# Patient Record
Sex: MALE | Race: WHITE | Employment: OTHER | URBAN - METROPOLITAN AREA
[De-identification: names, ages, dates, MRNs, and addresses within clinical notes are randomized per-mention and may not be internally consistent; named-entity substitution may affect disease eponyms.]

---

## 2017-01-30 PROBLEM — J44.9 COPD (CHRONIC OBSTRUCTIVE PULMONARY DISEASE) (HCC): Status: ACTIVE | Noted: 2017-01-30

## 2021-02-11 ENCOUNTER — INITIAL CONSULT (OUTPATIENT)
Dept: PULMONOLOGY | Age: 76
End: 2021-02-11
Payer: COMMERCIAL

## 2021-02-11 VITALS
SYSTOLIC BLOOD PRESSURE: 124 MMHG | HEART RATE: 62 BPM | HEIGHT: 70 IN | WEIGHT: 233 LBS | OXYGEN SATURATION: 96 % | BODY MASS INDEX: 33.36 KG/M2 | DIASTOLIC BLOOD PRESSURE: 68 MMHG

## 2021-02-11 DIAGNOSIS — Z87.891 FORMER SMOKER: ICD-10-CM

## 2021-02-11 DIAGNOSIS — J44.9 CHRONIC OBSTRUCTIVE PULMONARY DISEASE, UNSPECIFIED COPD TYPE (HCC): Primary | ICD-10-CM

## 2021-02-11 DIAGNOSIS — R06.02 SOB (SHORTNESS OF BREATH): ICD-10-CM

## 2021-02-11 DIAGNOSIS — Z01.818 PREOP TESTING: ICD-10-CM

## 2021-02-11 PROCEDURE — G8417 CALC BMI ABV UP PARAM F/U: HCPCS | Performed by: NURSE PRACTITIONER

## 2021-02-11 PROCEDURE — G8926 SPIRO NO PERF OR DOC: HCPCS | Performed by: NURSE PRACTITIONER

## 2021-02-11 PROCEDURE — G8484 FLU IMMUNIZE NO ADMIN: HCPCS | Performed by: NURSE PRACTITIONER

## 2021-02-11 PROCEDURE — 3023F SPIROM DOC REV: CPT | Performed by: NURSE PRACTITIONER

## 2021-02-11 PROCEDURE — 99204 OFFICE O/P NEW MOD 45 MIN: CPT | Performed by: NURSE PRACTITIONER

## 2021-02-11 PROCEDURE — G8427 DOCREV CUR MEDS BY ELIG CLIN: HCPCS | Performed by: NURSE PRACTITIONER

## 2021-02-11 RX ORDER — EZETIMIBE 10 MG/1
1 TABLET ORAL DAILY
COMMUNITY
Start: 2020-07-22

## 2021-02-11 RX ORDER — DILTIAZEM HYDROCHLORIDE 240 MG/1
240 CAPSULE, EXTENDED RELEASE ORAL ONCE
COMMUNITY
Start: 2020-07-22

## 2021-02-11 RX ORDER — COLESEVELAM 180 1/1
625 TABLET ORAL DAILY
COMMUNITY
Start: 2021-02-04

## 2021-02-11 RX ORDER — ALBUTEROL SULFATE 90 UG/1
90 POWDER, METERED RESPIRATORY (INHALATION)
COMMUNITY
Start: 2021-02-04

## 2021-02-11 RX ORDER — FLUTICASONE FUROATE, UMECLIDINIUM BROMIDE AND VILANTEROL TRIFENATATE 100; 62.5; 25 UG/1; UG/1; UG/1
1 POWDER RESPIRATORY (INHALATION) DAILY
COMMUNITY
Start: 2020-09-14

## 2021-02-11 RX ORDER — LOSARTAN POTASSIUM 50 MG/1
50 TABLET ORAL
COMMUNITY
Start: 2020-12-04

## 2021-02-11 NOTE — PROGRESS NOTES
Subjective:   CHIEF COMPLAINT / HPI:       Lou Robles is a 67 y.o. male with history of COPD, HTN, GERD, HLD, seasonal allergies, presents to pulmonary clinic to establish pulmonary care. He was last seen by pulmonary and had PFT 2014. He states that his PCP has been a managing his pulmonary medications. Ancelmo Luna states he had been told he had COPD but recently had CXR completed at [facility] and has told that the CXR demonstrated normal findings and wonders if he still needs treatment for COPD. He is currently taking Trelegy daily and albuterol rescue inhaler which he states he rarely uses. He is a former smoker but quit in 1988. He denies any cough or wheezes. He states he does have SOB with activity but never to point that he stops activity and needs to rest.     Ancelmo Luna states they are practicing social distancing, wearing a mask when out in public, washing hands frequently or using hand . Denies any fever, chills, malaise, change in sensation of taste or smell, headache or lightheadedness. Denies any known contacts with persons with respiratory infection, positive for coronavirus or under investigation for possible coronavirus exposure. He denies positive COVID testing.     Influenza immunization: 9/22/2020  Pneumococcal immunization: Laci Ghazi on 2/7/2017, [id number] on 8/17/2009  COVID-19 immunization: has received first dose  Smoking history: quit 1/1998, 30 pack year history  PCP: Orin Gale MD    Past Medical History:  Past Medical History:   Diagnosis Date    Hypertension        Current Medications:      Current Outpatient Medications:     albuterol sulfate (PROAIR RESPICLICK) 998 (90 Base) MCG/ACT aerosol powder inhalation, Inhale 90 Inhalers into the lungs, Disp: , Rfl:     dilTIAZem (TIAZAC) 240 MG extended release capsule, Take 240 mg by mouth once, Disp: , Rfl:     losartan (COZAAR) 50 MG tablet, Take 50 mg by mouth, Disp: , Rfl:   colesevelam (WELCHOL) 625 MG tablet, Take 625 mg by mouth daily, Disp: , Rfl:     ezetimibe (ZETIA) 10 MG tablet, Take 1 tablet by mouth daily, Disp: , Rfl:     fluticasone-umeclidin-vilant (TRELEGY ELLIPTA) 100-62.5-25 MCG/INH AEPB, Inhale 1 puff into the lungs daily, Disp: , Rfl:     Multiple Vitamins-Minerals (MENS MULTIVITAMIN PLUS PO), Take  by mouth., Disp: , Rfl:     ALPRAZolam (XANAX) 1 MG tablet, Take 1 mg by mouth nightly as needed for Sleep., Disp: , Rfl:     esomeprazole Magnesium (NEXIUM) 40 MG PACK, Take 40 mg by mouth daily. , Disp: , Rfl:     Naproxen Sod-Diphenhydramine (ALEVE PM PO), Take by mouth nightly as needed, Disp: , Rfl:     amLODIPine-benazepril (LOTREL) 5-40 MG per capsule, Take 1 capsule by mouth daily. , Disp: , Rfl:     Red Yeast Rice Extract (RED YEAST RICE PO), Take  by mouth., Disp: , Rfl:     Melatonin 10 MG TABS, Take  by mouth., Disp: , Rfl:     VALERIAN ROOT PO, Take  by mouth., Disp: , Rfl:     Passion Flower POWD, by Does not apply route., Disp: , Rfl:     fluticasone (FLONASE) 50 MCG/ACT nasal spray, 1 spray by Nasal route daily. , Disp: , Rfl:     Lutein 20 MG CAPS, Take  by mouth., Disp: , Rfl:     Allergies   Allergen Reactions    Hydrocodone-Acetaminophen Itching       Social History:    Social History     Socioeconomic History    Marital status:      Spouse name: None    Number of children: None    Years of education: None    Highest education level: None   Occupational History    None   Social Needs    Financial resource strain: None    Food insecurity     Worry: None     Inability: None    Transportation needs     Medical: None     Non-medical: None   Tobacco Use    Smoking status: Former Smoker     Packs/day: 1.00     Years: 30.00     Pack years: 30.00     Types: Cigarettes     Quit date: 1988     Years since quittin.1    Smokeless tobacco: Never Used   Substance and Sexual Activity    Alcohol use:  No Alcohol/week: 0.0 standard drinks    Drug use: None    Sexual activity: None   Lifestyle    Physical activity     Days per week: None     Minutes per session: None    Stress: None   Relationships    Social connections     Talks on phone: None     Gets together: None     Attends Zoroastrianism service: None     Active member of club or organization: None     Attends meetings of clubs or organizations: None     Relationship status: None    Intimate partner violence     Fear of current or ex partner: None     Emotionally abused: None     Physically abused: None     Forced sexual activity: None   Other Topics Concern    None   Social History Narrative    None       Family History:    History reviewed. No pertinent family history. REVIEW OF SYSTEMS:    CONSTITUTIONAL:  Negative for fevers, chills, diaphoresis, activity change, appetite change, night sweats, unexpected weight change.    HEENT:  Negative for hearing loss,  sinus pressure, nasal congestion, epistaxis and snoring  RESPIRATORY: Negative for wheeze, negative for cough,+ occasional shortness of breath with activity  CARDIOVASCULAR:  Negative for chest pain, palpitations, exertional chest pressure/discomfort, edema, syncope  GASTROINTESTINAL: Negative for nausea, vomiting, diarrhea, constipation, blood in stool and abdominal pain  GENITOURINARY:  Negative for frequency, dysuria and hematuria  HEMATOLOGIC/LYMPHATIC:  Negative for easy bruising, bleeding and lymphadenopathy  ALLERGIC/IMMUNOLOGIC:  Negative for recurrent infections, angioedema, anaphylaxis and drug reaction  MUSCULOSKELETAL:  Negative for  pain, joint swelling, decreased range of motion and muscle weakness  NEURO: Negative for headache, AMS, decrease sensations  SKIN: Negative for rashes or lesions      Objective:   VITALS:   Vitals:    02/11/21 1340   BP: 124/68   Site: Right Upper Arm   Position: Sitting   Cuff Size: Large Adult   Pulse: 62   SpO2: 96%   Weight: 233 lb (105.7 kg) Height: 5' 10\" (1.778 m)       Body mass index is 33.43 kg/m². PHYSICAL EXAM:    CONSTITUTIONAL:  awake, alert, cooperative, no apparent distress, and appears stated age  HEENT:  Supple and nontender,  trachea midline, no adenopathy, thyroid normal, no JVD, no wheezing or stridor over neck  CHEST: Chest expansion equal and symmetrical, no intercostal retraction, no increased work of breathing  LUNGS: Bilateral breath sounds clear and equal to auscultation, good air movement, no use of accessory muscles to support respiratory effort. No forced expiratory wheeze, no rales, no rhonchi, no cough noted with assessment  CARDIOVASCULAR: Normal S1 and S2 , no murmurs or gallops ,no pericardial rubs  ABDOMEN:  normal bowel sounds, non-distended and no masses palpated, and no tenderness to palpation. LYMPHADENOPATHY:  no axillary or supraclavicular adenopathy.  No cervical adenopathy  EXTREMITIES: No edema, no inflammation, no tenderness, no clubbing of digits  NEURO: Oriented X 3, No focal deficits  SKIN: warm and dry    LAB:CBC with Differential:    Lab Results   Component Value Date    WBC 8.3 12/20/2010    RBC 4.82 12/20/2010    HGB 14.9 12/20/2010    HCT 43.7 12/20/2010     12/21/2010    MCV 90.7 12/20/2010    MCH 30.9 12/20/2010    MCHC 34.1 12/20/2010    RDW 13.8 12/20/2010    SEGSPCT 58.3 12/20/2010    LYMPHOPCT 29.7 12/20/2010    MONOPCT 6.8 12/20/2010    EOSPCT 4.4 12/20/2010    BASOPCT 0.8 12/20/2010    MONOSABS 0.6 12/20/2010    LYMPHSABS 2.5 12/20/2010    EOSABS 0.4 12/20/2010    BASOSABS 0.1 12/20/2010    DIFFTYPE AUTOMATED DIFFERENTIAL 12/20/2010     BMP:    Lab Results   Component Value Date     12/20/2010    K 4.6 12/20/2010     12/20/2010    CO2 25 12/20/2010    BUN 19 12/20/2010    CREATININE 1.1 12/20/2010    CALCIUM 9.3 12/20/2010    GFRAA >60 12/20/2010    LABGLOM >60 12/20/2010     Hepatic Function Panel:    Lab Results   Component Value Date    ALKPHOS 69 12/20/2010  12/20/2010    AST 99 12/20/2010    PROT 7.0 12/20/2010    BILITOT 0.4 12/20/2010     ABG:  No results found for: [place] Jiang, PHART, THGBART, [id number], PO2ART, [id number]    RADIOLOGY:  02/04/2021 CXR 2 view  No acute cardiopulmonary findings    PFT:   11/18/2014 Dr. Fritz Barnett office  FVC 94% predicted/96% predicted, FEV1 81% predicted/86% predicted, FEF 25-75% 44% predicted/53% predicted, TLC 90% predicted, RV 86% predicted, DLCO 85% predicted  Following administration of bronchodilator there was mild significant response to bronchodilator. Overall testing indicates mild obstructive lung defect, 22% increase in FEF 25 to 75% indicating mild response to bronchodilator therapy    Assessment      1. Chronic obstructive pulmonary disease, unspecified COPD type ([facility] [address]    2. SOB (shortness of breath)    3. Former smoker    4. Preop testing        Plan:   He has not had a pulmonary function test since 2014 at Dr. Sharda Velasco office. That testing at that time demonstrated mild obstructive disease with mild diffusion defect. He states he was recently placed on Trelegy by his primary care provider and has been using daily and rinsing his mouth well after use. He states he has had minimal use of his albuterol rescue inhaler. He states he does have shortness of breath occasionally with exertion. He recently had chest x-ray completed which she states he was told was normal.  I do not have access to x-ray study at this time but we will request the disc so that I may view x-ray. We will also get a pulmonary function test to assess his pulmonary function and compare it to his prior study from 2014. He is aware he will need to have a Covid test for prescreening prior to pulmonary function test.  He is while he was a smoker and quit smoking in 1988 has never had a baseline CT x-ray. We will obtain a baseline CT. He has received his first COVID-19 immunization and is scheduled to reconceive his second next week. He states he had no reaction to his first immunization. We have discussed the need to maintain yearly flu immunization, pneumococcal vaccination. We have discussed Coronavirus precaution including: social distancing when needing to be in public, handwashing practice, wiping items touched in public such as gas pumps, door handles, shopping carts, etc. Self monitoring for infection - fever, chills, cough, SOB. Should they develop symptoms they should call office for further instructions. Return in about 6 months (around 8/11/2021) for CT Chest, PFT. This dictation was performed with a verbal recognition program and it was checked for errors. It is possible that there are still dictated errors within this office note. Any errors should be brought immediately to my attention for correction. All efforts were made to ensure that this office note is accurate.        Electronically signed by ALIDA Bustillo CNP on 2/11/2021 at 8:25 PM

## 2021-02-18 ENCOUNTER — HOSPITAL ENCOUNTER (OUTPATIENT)
Dept: LAB | Age: 76
Discharge: HOME | End: 2021-02-18
Payer: COMMERCIAL

## 2021-02-18 PROCEDURE — C9803 HOPD COVID-19 SPEC COLLECT: HCPCS

## 2021-02-18 PROCEDURE — U0002 COVID-19 LAB TEST NON-CDC: HCPCS

## 2021-02-19 LAB
SARS-COV-2: NOT DETECTED
SOURCE: NORMAL

## 2021-02-23 ENCOUNTER — HOSPITAL ENCOUNTER (OUTPATIENT)
Dept: CT IMAGING | Age: 76
Discharge: HOME | End: 2021-02-23
Payer: COMMERCIAL

## 2021-02-23 ENCOUNTER — HOSPITAL ENCOUNTER (OUTPATIENT)
Dept: PULMONOLOGY | Age: 76
Discharge: HOME | End: 2021-02-23
Payer: COMMERCIAL

## 2021-02-23 DIAGNOSIS — R06.02 SOB (SHORTNESS OF BREATH): ICD-10-CM

## 2021-02-23 DIAGNOSIS — J44.9 CHRONIC OBSTRUCTIVE PULMONARY DISEASE, UNSPECIFIED COPD TYPE (HCC): ICD-10-CM

## 2021-02-23 DIAGNOSIS — Z87.891 FORMER SMOKER: ICD-10-CM

## 2021-02-23 LAB
DLCO %PRED: 65 %
DLCO %PRED: 65 %
DLCO PRED: NORMAL
DLCO PRED: NORMAL
DLCO/VA %PRED: NORMAL
DLCO/VA %PRED: NORMAL
DLCO/VA PRED: NORMAL
DLCO/VA PRED: NORMAL
DLCO/VA: NORMAL
DLCO/VA: NORMAL
DLCO: NORMAL
DLCO: NORMAL
EXPIRATORY TIME-POST: NORMAL
EXPIRATORY TIME-POST: NORMAL
EXPIRATORY TIME: NORMAL
EXPIRATORY TIME: NORMAL
FEF 25-75% %CHNG: NORMAL
FEF 25-75% %CHNG: NORMAL
FEF 25-75% %PRED-POST: NORMAL
FEF 25-75% %PRED-POST: NORMAL
FEF 25-75% %PRED-PRE: NORMAL
FEF 25-75% %PRED-PRE: NORMAL
FEF 25-75% PRED: NORMAL
FEF 25-75% PRED: NORMAL
FEF 25-75%-POST: NORMAL
FEF 25-75%-POST: NORMAL
FEF 25-75%-PRE: NORMAL
FEF 25-75%-PRE: NORMAL
FEV1 %PRED-POST: 86 %
FEV1 %PRED-POST: 93 %
FEV1 %PRED-PRE: 79 %
FEV1 %PRED-PRE: 87 %
FEV1 PRED: NORMAL
FEV1 PRED: NORMAL
FEV1-POST: NORMAL
FEV1-POST: NORMAL
FEV1-PRE: NORMAL
FEV1-PRE: NORMAL
FEV1/FVC %PRED-POST: NORMAL
FEV1/FVC %PRED-POST: NORMAL
FEV1/FVC %PRED-PRE: NORMAL
FEV1/FVC %PRED-PRE: NORMAL
FEV1/FVC PRED: NORMAL
FEV1/FVC PRED: NORMAL
FEV1/FVC-POST: 78 %
FEV1/FVC-POST: 86 %
FEV1/FVC-PRE: 76 %
FEV1/FVC-PRE: 83 %
FVC %PRED-POST: NORMAL
FVC %PRED-POST: NORMAL
FVC %PRED-PRE: NORMAL
FVC %PRED-PRE: NORMAL
FVC PRED: NORMAL
FVC PRED: NORMAL
FVC-POST: NORMAL
FVC-POST: NORMAL
FVC-PRE: NORMAL
FVC-PRE: NORMAL
GAW %PRED: NORMAL
GAW %PRED: NORMAL
GAW PRED: NORMAL
GAW PRED: NORMAL
GAW: NORMAL
GAW: NORMAL
GFR AFRICAN AMERICAN: 60 ML/MIN/1.73M2
GFR NON-AFRICAN AMERICAN: 49 ML/MIN/1.73M2
IC %PRED: NORMAL
IC %PRED: NORMAL
IC PRED: NORMAL
IC PRED: NORMAL
IC: NORMAL
IC: NORMAL
MEP: NORMAL
MEP: NORMAL
MIP: NORMAL
MIP: NORMAL
MVV %PRED-PRE: NORMAL
MVV %PRED-PRE: NORMAL
MVV PRED: NORMAL
MVV PRED: NORMAL
MVV-PRE: NORMAL
MVV-PRE: NORMAL
PEF %PRED-POST: NORMAL
PEF %PRED-POST: NORMAL
PEF %PRED-PRE: NORMAL
PEF %PRED-PRE: NORMAL
PEF PRED: NORMAL
PEF PRED: NORMAL
PEF%CHNG: NORMAL
PEF%CHNG: NORMAL
PEF-POST: NORMAL
PEF-POST: NORMAL
PEF-PRE: NORMAL
PEF-PRE: NORMAL
POC CREATININE: 1.4 MG/DL (ref 0.9–1.3)
RAW %PRED: NORMAL
RAW %PRED: NORMAL
RAW PRED: NORMAL
RAW PRED: NORMAL
RAW: NORMAL
RAW: NORMAL
RV %PRED: NORMAL
RV %PRED: NORMAL
RV PRED: NORMAL
RV PRED: NORMAL
RV: NORMAL
RV: NORMAL
SVC %PRED: NORMAL
SVC %PRED: NORMAL
SVC PRED: NORMAL
SVC PRED: NORMAL
SVC: NORMAL
SVC: NORMAL
TLC %PRED: 81 %
TLC %PRED: 91 %
TLC PRED: NORMAL
TLC PRED: NORMAL
TLC: NORMAL
TLC: NORMAL
VA %PRED: NORMAL
VA %PRED: NORMAL
VA PRED: NORMAL
VA PRED: NORMAL
VA: NORMAL
VA: NORMAL
VTG %PRED: NORMAL
VTG %PRED: NORMAL
VTG PRED: NORMAL
VTG PRED: NORMAL
VTG: NORMAL
VTG: NORMAL

## 2021-02-23 PROCEDURE — 2580000003 HC RX 258: Performed by: FAMILY MEDICINE

## 2021-02-23 PROCEDURE — 6360000004 HC RX CONTRAST MEDICATION: Performed by: FAMILY MEDICINE

## 2021-02-23 PROCEDURE — 94729 DIFFUSING CAPACITY: CPT

## 2021-02-23 PROCEDURE — 71260 CT THORAX DX C+: CPT

## 2021-02-23 PROCEDURE — 94727 GAS DIL/WSHOT DETER LNG VOL: CPT

## 2021-02-23 PROCEDURE — 94060 EVALUATION OF WHEEZING: CPT

## 2021-02-23 RX ORDER — SODIUM CHLORIDE 0.9 % (FLUSH) 0.9 %
10 SYRINGE (ML) INJECTION PRN
Status: DISCONTINUED | OUTPATIENT
Start: 2021-02-23 | End: 2021-02-24 | Stop reason: HOSPADM

## 2021-02-23 RX ADMIN — Medication 10 ML: at 13:12

## 2021-02-23 RX ADMIN — IOPAMIDOL 80 ML: 755 INJECTION, SOLUTION INTRAVENOUS at 13:12

## 2021-02-23 ASSESSMENT — PULMONARY FUNCTION TESTS
FEV1/FVC_POST: 86
FEV1/FVC_PRE: 76
FEV1/FVC_POST: 78
FEV1_PERCENT_PREDICTED_PRE: 87
FEV1_PERCENT_PREDICTED_POST: 86
FEV1_PERCENT_PREDICTED_PRE: 79
FEV1/FVC_PRE: 83
FEV1_PERCENT_PREDICTED_POST: 93

## 2021-03-03 ENCOUNTER — TELEPHONE (OUTPATIENT)
Dept: PULMONOLOGY | Age: 76
End: 2021-03-03

## 2021-03-03 NOTE — TELEPHONE ENCOUNTER
Patient's wife Sebastián Mcleod called to check the status of the request made for a new mask for his CPAP machine. Please contact patient or wife at home number and leave detailed message on machine if no answer, per Sebastián Mcleod.

## 2021-08-12 ENCOUNTER — OFFICE VISIT (OUTPATIENT)
Dept: PULMONOLOGY | Age: 76
End: 2021-08-12
Payer: COMMERCIAL

## 2021-08-12 VITALS
WEIGHT: 229 LBS | HEART RATE: 76 BPM | HEIGHT: 69 IN | DIASTOLIC BLOOD PRESSURE: 74 MMHG | OXYGEN SATURATION: 95 % | BODY MASS INDEX: 33.92 KG/M2 | SYSTOLIC BLOOD PRESSURE: 122 MMHG

## 2021-08-12 DIAGNOSIS — Z00.00 HEALTHCARE MAINTENANCE: ICD-10-CM

## 2021-08-12 DIAGNOSIS — J43.9 PULMONARY EMPHYSEMA, UNSPECIFIED EMPHYSEMA TYPE (HCC): Primary | ICD-10-CM

## 2021-08-12 DIAGNOSIS — Z87.891 PERSONAL HISTORY OF TOBACCO USE: ICD-10-CM

## 2021-08-12 DIAGNOSIS — Z01.818 PREOP TESTING: ICD-10-CM

## 2021-08-12 DIAGNOSIS — R91.8 PULMONARY NODULES/LESIONS, MULTIPLE: ICD-10-CM

## 2021-08-12 DIAGNOSIS — G47.33 OSA (OBSTRUCTIVE SLEEP APNEA): ICD-10-CM

## 2021-08-12 PROCEDURE — 1036F TOBACCO NON-USER: CPT | Performed by: NURSE PRACTITIONER

## 2021-08-12 PROCEDURE — G8427 DOCREV CUR MEDS BY ELIG CLIN: HCPCS | Performed by: NURSE PRACTITIONER

## 2021-08-12 PROCEDURE — 4040F PNEUMOC VAC/ADMIN/RCVD: CPT | Performed by: NURSE PRACTITIONER

## 2021-08-12 PROCEDURE — 1123F ACP DISCUSS/DSCN MKR DOCD: CPT | Performed by: NURSE PRACTITIONER

## 2021-08-12 PROCEDURE — 99214 OFFICE O/P EST MOD 30 MIN: CPT | Performed by: NURSE PRACTITIONER

## 2021-08-12 PROCEDURE — G8417 CALC BMI ABV UP PARAM F/U: HCPCS | Performed by: NURSE PRACTITIONER

## 2021-08-12 PROCEDURE — G8926 SPIRO NO PERF OR DOC: HCPCS | Performed by: NURSE PRACTITIONER

## 2021-08-12 PROCEDURE — 3023F SPIROM DOC REV: CPT | Performed by: NURSE PRACTITIONER

## 2021-08-12 RX ORDER — IBUPROFEN 400 MG/1
400 TABLET ORAL EVERY 6 HOURS PRN
COMMUNITY

## 2021-08-12 NOTE — PROGRESS NOTES
Pulmonary Clinic Office Follow up     Harvey Nageotte is a 67 y.o. male with history of COPD, HTN, GERD, HLD, seasonal allergies, pulmonary emphysema, JANET noncompliant with CPAP, presents today to the pulmonary clinic for follow up. I last saw Shanelle Del Cid in consult in the pulmonary clinic on 2/11/2021. He stated at that time he had been referred to pulmonary clinic for management of his pulmonary medications. Unfortunately we had not received his notes from his primary care provider at [facility] until after his visit. Review of the chart note that he did have a chest x-ray completed at [facility] which demonstrated worsening pulmonary emphysema despite being on Trelegy. He had been on Atrovent prior to switching to Trelegy    He is a former smoker who quit in 1987. He has a 30-pack-year history. He continues to deny any cough wheezing. He did mention at last visit that he does have some shortness of breath activity but never to the point that he needs to stop his activity feels he needs to rest or uses his albuterol rescue inhaler for. Since I last saw Shanelle Del Cid he did complete a CT lung screening which demonstrated multiple nodules in the right middle lobe, left lung apex, left lower lobe. He is to have a follow-up CT scan in February 2022. He did also complete a pulmonary function test on 2/23/2021 which demonstrated mild diffusion defect with a DLCO of 65%. We will continue his Trelegy at this time along with his albuterol rescue inhaler    He does have JANET and has had tried CPAP in the past however he had difficulty with the mask and therefore has not wore his CPAP for some time. He states he does continue to have problems with sleep both falling to sleep and maintaining sleep.   We had initially sent his paperwork for renewal of equipment to [facility] as he brings requesting a change in his DME per his insurance. Unfortunately [employer] was not able to accommodate him and information was then sent to Toyin Bledsoe. According to his recent compliancy he has not wore his CPAP since April of this year. He still complains of insomnia with difficulty falling asleep, hypersomnia during the day. He states he is practicing social distancing, wearing a mask when out in public, washing hands frequently or using hand . Denies any fever, chills, malaise, change in sensation of taste or smell, headache or lightheadedness. Denies any known contacts with persons with respiratory infection, positive for coronavirus or under investigation for possible coronavirus exposure. Influenza immunization: 9/22/2020  Pneumococcal immunization: Prevnar 13 2/7/2017  COVID-19 immunization:  2/16/2021, 1/26/2021  Smoking history: Former smoker quit 1987, 30+ pack year history  PCP: Junito Lizarraga MD    Past Medical History:  Past Medical History:   Diagnosis Date    Hypertension        Current medications and allergies have been reviewed    Social and family history unchanged from initial consult      REVIEW OF SYSTEMS:    CONSTITUTIONAL:  negative for fevers, chills, diaphoresis, activity change, appetite change, night sweats and unexpected weight change.   Positive hypersomnia  HEENT:  Negative for hearing loss, sinus pressure, nasal congestion, epistaxis, snoring  RESPIRATORY: Negative cough, negative wheeze, positive occasional shortness of breath with exertion  CARDIOVASCULAR:  Negative for chest pain, palpitations, exertional chest pressure/discomfort, edema, syncope  GASTROINTESTINAL: Negative for nausea, vomiting, diarrhea, constipation, blood in stool and abdominal pain  GENITOURINARY:  Negative for frequency, dysuria and hematuria  HEMATOLOGIC/LYMPHATIC:  Negative for easy bruising, bleeding and lymphadenopathy  ALLERGIC/IMMUNOLOGIC:  Negative for recurrent infections, angioedema, anaphylaxis and drug reaction  MUSCULOSKELETAL:  Negative for pain, joint swelling, decreased range of motion and muscle weakness  NEURO: Negative for headache, AMS, decrease sensations  SKIN: Negative for rashes or lesions      Physical Exam:  /74   Pulse 76   Ht 5' 9\" (1.753 m)   Wt 229 lb (103.9 kg)   SpO2 95%   BMI 33.82 kg/m²    Body mass index is 33.82 kg/m². Constitutional:  He appears well developed and well-nourished. Neck:  Supple, no palpable lymphadenopathy, no JVD  Cardiovascular:  S1, S2 Normal, Regular rhythm, no murmurs or gallops, no pericardial  rubs. Pulmonary:  Bilateral breath sounds clear and equal to auscultation, good air movement, no use of accessory muscles to support respiratory effort. No forced expiratory wheeze, no rales, no rhonchi, no cough noted during assessment. O2 sat at rest on room air is 95%  Abdomen: No epigastric pain, no distention, + bowel sounds   Extremities: No edema, no calf tenderness, no clubbing of digits  Neurologic:  Awake and alert, no focal deficits  Skin: warm and dry    Radiology:   2/23/2021 CT of chest lung screening  Mediastinum: The thoracic aorta is normal in caliber with mild   atherosclerotic vascular disease.  Coronary artery atherosclerotic vascular   calcifications are also seen.  No acute abnormality in the branch vessels of   the superior mediastinum and lower neck.  The main pulmonary artery is normal   in caliber.  No central pulmonary embolism.  The heart is normal in size.  No   pericardial effusion.  The mediastinal esophagus and thyroid gland are   unremarkable.  No lymphadenopathy.       Lungs/pleura: The central airways are patent.  No pleural effusion or   pneumothorax.  Mild emphysematous changes.  No consolidation or interlobular   septal thickening.  Small intrapulmonary node along the right minor fissure.    5 mm nodule in the right middle lobe on image 58 series 3.  4 mm nodule in   the right middle lobe on image 62.  3 mm nodule in the right middle lobe on   image 66.  3 mm nodule in the left lung apex on image 24.  4 mm nodule in the   left lower lobe on image 77.       Upper Abdomen: Mildly nodular liver surface.  No focal hepatic abnormality   identified.  3.2 cm indeterminate right renal cystic lesion measuring   hyperdense to water.  Partially visualized left renal cystic lesion measuring   at least 3.4 cm and mildly hyperdense to water.       Soft Tissues/Bones: No acute osseous or soft tissue abnormality. PFT:   2/23/2021  FVC 85% predicted/89% predicted, FEV1 87% predicted/93% predicted, FEF 25-75% 99% predicted/131% predicted , TLC 81% predicted, RV 91% predicted, DLCO 65% predicted  Following administration of bronchodilator there was no significant response to bronchodilator. Overall testing indicates mild diffusion defect of pulmonary vasculature    ASSESSMENT/PLAN:  1. Pulmonary emphysema, unspecified emphysema type (HCC)    2. Pulmonary nodules/lesions, multiple    3. JANET (obstructive sleep apnea)    4. Personal history of tobacco use    5. Preop testing    6. Healthcare maintenance      1. Continue Trelegy and albuterol rescue inhaler as needed  2. Pulmonary function test 2/23/2022, he is aware he will need to complete Covid 19 prescreening prior to pulmonary function test.  He has been advised not to use his Trelegy or his albuterol rescue inhaler prior to his pulmonary function test  3. Repeat low-dose CT lung screening for surveillance of multiple pulmonary nodules in the right middle lobe, left lower lobe, left lung apex. 4.  Does not wish to wear CPAP, have discussed at length health ramifications related to untreated JANET including worsening cardiovascular disease, hypertension, increased risk for PE, DVT, stroke, MI, kidney disease, impotence. 5.  He is aware of need to maintain yearly flu immunization, pneumococcal vaccination.    6.  Advised to receive COVID-19 immunization given his pulmonary history along with his cardiovascular history. Have discussed with him my recommendations as to why he should receive immunization along with how immunization will work to protect him in the future  7. We have discussed Coronavirus precaution including: social distancing when needing to be in public, handwashing practice, wiping items touched in public such as gas pumps, door handles, shopping carts, etc. Self monitoring for infection - fever, chills, cough, SOB. Should they develop symptoms they should call office for further instructions. 8.  Return in about 6 months (around 2/12/2022) for CT Chest, 6 month follow up. Copy of note will be sent to Dr. Elizabeth Valenzuela    This dictation was performed with a verbal recognition program and it was checked for errors. It is possible that there are still dictated errors within this office note. Any errors should be brought immediately to my attention for correction. All efforts were made to ensure that this office note is accurate.

## 2021-08-21 PROBLEM — J43.9 PULMONARY EMPHYSEMA (HCC): Status: ACTIVE | Noted: 2017-01-30

## 2021-08-21 PROBLEM — G47.33 OSA (OBSTRUCTIVE SLEEP APNEA): Status: ACTIVE | Noted: 2021-08-21

## 2021-08-21 PROBLEM — R91.8 PULMONARY NODULES/LESIONS, MULTIPLE: Status: ACTIVE | Noted: 2021-08-21

## 2022-03-15 ENCOUNTER — HOSPITAL ENCOUNTER (EMERGENCY)
Age: 77
Discharge: HOME | End: 2022-03-15
Attending: EMERGENCY MEDICINE
Payer: COMMERCIAL

## 2022-03-15 ENCOUNTER — APPOINTMENT (OUTPATIENT)
Dept: GENERAL RADIOLOGY | Age: 77
End: 2022-03-15
Payer: COMMERCIAL

## 2022-03-15 VITALS
HEART RATE: 57 BPM | BODY MASS INDEX: 34.07 KG/M2 | HEIGHT: 69 IN | DIASTOLIC BLOOD PRESSURE: 72 MMHG | SYSTOLIC BLOOD PRESSURE: 120 MMHG | OXYGEN SATURATION: 96 % | TEMPERATURE: 97.4 F | WEIGHT: 230 LBS | RESPIRATION RATE: 18 BRPM

## 2022-03-15 DIAGNOSIS — R06.00 DYSPNEA, UNSPECIFIED TYPE: Primary | ICD-10-CM

## 2022-03-15 LAB
ALBUMIN SERPL-MCNC: 4.3 GM/DL (ref 3.4–5)
ALP BLD-CCNC: 90 IU/L (ref 40–129)
ALT SERPL-CCNC: 49 U/L (ref 10–40)
ANION GAP SERPL CALCULATED.3IONS-SCNC: 12 MMOL/L (ref 4–16)
AST SERPL-CCNC: 34 IU/L (ref 15–37)
BASOPHILS ABSOLUTE: 0.1 K/CU MM
BASOPHILS RELATIVE PERCENT: 0.7 % (ref 0–1)
BILIRUB SERPL-MCNC: 0.9 MG/DL (ref 0–1)
BUN BLDV-MCNC: 20 MG/DL (ref 6–23)
CALCIUM SERPL-MCNC: 9.1 MG/DL (ref 8.3–10.6)
CHLORIDE BLD-SCNC: 101 MMOL/L (ref 99–110)
CO2: 22 MMOL/L (ref 21–32)
CREAT SERPL-MCNC: 1.2 MG/DL (ref 0.9–1.3)
DIFFERENTIAL TYPE: ABNORMAL
EKG ATRIAL RATE: 56 BPM
EKG ATRIAL RATE: 56 BPM
EKG DIAGNOSIS: NORMAL
EKG DIAGNOSIS: NORMAL
EKG P AXIS: 18 DEGREES
EKG P AXIS: 41 DEGREES
EKG P-R INTERVAL: 182 MS
EKG P-R INTERVAL: 204 MS
EKG Q-T INTERVAL: 434 MS
EKG Q-T INTERVAL: 460 MS
EKG QRS DURATION: 112 MS
EKG QRS DURATION: 98 MS
EKG QTC CALCULATION (BAZETT): 418 MS
EKG QTC CALCULATION (BAZETT): 443 MS
EKG R AXIS: -16 DEGREES
EKG R AXIS: -20 DEGREES
EKG T AXIS: 33 DEGREES
EKG T AXIS: 36 DEGREES
EKG VENTRICULAR RATE: 56 BPM
EKG VENTRICULAR RATE: 56 BPM
EOSINOPHILS ABSOLUTE: 0.2 K/CU MM
EOSINOPHILS RELATIVE PERCENT: 2.2 % (ref 0–3)
GFR AFRICAN AMERICAN: >60 ML/MIN/1.73M2
GFR NON-AFRICAN AMERICAN: 59 ML/MIN/1.73M2
GLUCOSE BLD-MCNC: 130 MG/DL (ref 70–99)
HCT VFR BLD CALC: 42.9 % (ref 42–52)
HEMOGLOBIN: 14.4 GM/DL (ref 13.5–18)
IMMATURE NEUTROPHIL %: 0.4 % (ref 0–0.43)
LYMPHOCYTES ABSOLUTE: 1.9 K/CU MM
LYMPHOCYTES RELATIVE PERCENT: 16.6 % (ref 24–44)
MCH RBC QN AUTO: 29.7 PG (ref 27–31)
MCHC RBC AUTO-ENTMCNC: 33.6 % (ref 32–36)
MCV RBC AUTO: 88.5 FL (ref 78–100)
MONOCYTES ABSOLUTE: 1.1 K/CU MM
MONOCYTES RELATIVE PERCENT: 10.1 % (ref 0–4)
PDW BLD-RTO: 13.3 % (ref 11.7–14.9)
PLATELET # BLD: 232 K/CU MM (ref 140–440)
PMV BLD AUTO: 11.4 FL (ref 7.5–11.1)
POTASSIUM SERPL-SCNC: 4.1 MMOL/L (ref 3.5–5.1)
RBC # BLD: 4.85 M/CU MM (ref 4.6–6.2)
SEGMENTED NEUTROPHILS ABSOLUTE COUNT: 7.8 K/CU MM
SEGMENTED NEUTROPHILS RELATIVE PERCENT: 70 % (ref 36–66)
SODIUM BLD-SCNC: 135 MMOL/L (ref 135–145)
TOTAL IMMATURE NEUTOROPHIL: 0.04 K/CU MM
TOTAL PROTEIN: 7 GM/DL (ref 6.4–8.2)
TROPONIN T: <0.01 NG/ML
WBC # BLD: 11.1 K/CU MM (ref 4–10.5)

## 2022-03-15 PROCEDURE — 84484 ASSAY OF TROPONIN QUANT: CPT

## 2022-03-15 PROCEDURE — 71046 X-RAY EXAM CHEST 2 VIEWS: CPT

## 2022-03-15 PROCEDURE — 99282 EMERGENCY DEPT VISIT SF MDM: CPT

## 2022-03-15 PROCEDURE — 85025 COMPLETE CBC W/AUTO DIFF WBC: CPT

## 2022-03-15 PROCEDURE — 93010 ELECTROCARDIOGRAM REPORT: CPT | Performed by: INTERNAL MEDICINE

## 2022-03-15 PROCEDURE — 80053 COMPREHEN METABOLIC PANEL: CPT

## 2022-03-15 PROCEDURE — 93005 ELECTROCARDIOGRAM TRACING: CPT | Performed by: EMERGENCY MEDICINE

## 2022-03-15 RX ORDER — PREDNISONE 50 MG/1
50 TABLET ORAL DAILY
Qty: 5 TABLET | Refills: 0 | Status: SHIPPED | OUTPATIENT
Start: 2022-03-15 | End: 2022-03-20

## 2022-03-15 NOTE — ED NOTES
Discharge instructions and prescription information given to the patient who expressed understanding of information and follow up care.       Carin Macedo RN  03/15/22 [id number]

## 2022-03-15 NOTE — ED PROVIDER NOTES
EMERGENCY DEPARTMENT ENCOUNTER    Patient: Zhanna Norton  MRN: [id number]  : 1945  Date of Evaluation: 3/15/2022  ED Provider:  Jessica Alba MD    CHIEF COMPLAINT  Chief Complaint   Patient presents with    Shortness of Breath    Cough    Headache       HPI  Zhanna Norton is a 67 y.o. male who presents with complaints of mild to moderate shortness of breath with nonproductive cough onset over the last 24 hours as well as complaints of a moderate uncomfortable tightness up in neck which occurs primarily with inspiration. Has had some associated wheezing. Denies chest pain. Denies palpitations. Denies edema. Denies fever. Denies wheezing. Denies any other associated symptoms or complaints or concerns. REVIEW OF SYSTEMS    Constitutional: negative for fever, chills  Neurological: negative for HA, focal weakness, loss of sensation  Ophthalmic: negative for vision change  ENT: negative for congestion, rhinorrhea  Cardiovascular: negative for chest pain, palpitations, edema  Respiratory: negative for sputum  GI: negative for abdominal pain, nausea, vomiting, diarrhea, constipation  : negative for dysuria, hematuria  Musculoskeletal: negative for myalgias, decreased ROM, joint swelling  Dermatological: negative for rash, wounds  Heme: Negative for bleeding, bruising      PAST MEDICAL HISTORY  Past Medical History:   Diagnosis Date    COPD (chronic obstructive pulmonary disease) ([facility] [address]     Hypertension        CURRENT MEDICATIONS  [unfilled]    ALLERGIES  Allergies   Allergen Reactions    Hydrocodone-Acetaminophen Itching       SURGICAL HISTORY  Past Surgical History:   Procedure Laterality Date    CARPAL TUNNEL RELEASE      MASTOIDECTOMY      SINUS ENDOSCOPY         FAMILY HISTORY  History reviewed. No pertinent family history.     SOCIAL HISTORY  Social History     Socioeconomic History    Marital status:      Spouse name: None    Number of children: None    Years of education: None    Highest education level: None   Occupational History    None   Tobacco Use    Smoking status: Former Smoker     Packs/day: 1.00     Years: 30.00     Pack years: 30.00     Types: Cigarettes     Quit date: 1988     Years since quittin.2    Smokeless tobacco: Never Used   Substance and Sexual Activity    Alcohol use: No     Alcohol/week: 0.0 standard drinks    Drug use: None    Sexual activity: None   Other Topics Concern    None   Social History Narrative    None     Social Determinants of Health     Financial Resource Strain:     Difficulty of Paying Living Expenses: Not on file   Food Insecurity:     Worried About Running Out of Food in the Last Year: Not on file    Emir of Food in the Last Year: Not on file   Transportation Needs:     Lack of Transportation (Medical): Not on file    Lack of Transportation (Non-Medical):  Not on file   Physical Activity:     Days of Exercise per Week: Not on file    Minutes of Exercise per Session: Not on file   Stress:     Feeling of Stress : Not on file   Social Connections:     Frequency of Communication with Friends and Family: Not on file    Frequency of Social Gatherings with Friends and Family: Not on file    Attends Mu-ism Services: Not on file    Active Member of [address] [place] [address] or Organizations: Not on file    Attends Club or Organization Meetings: Not on file    Marital Status: Not on file   Intimate Partner Violence:     Fear of Current or Ex-Partner: Not on file    Emotionally Abused: Not on file    Physically Abused: Not on file    Sexually Abused: Not on file   Housing Stability:     Unable to Pay for Housing in the Last Year: Not on file    Number of [place] in the Last Year: Not on file    Unstable Housing in the Last Year: Not on file         **Past medical, family and social histories, and nursing notes reviewed and verified by me**      PHYSICAL EXAM  VITAL SIGNS:   ED Triage Vitals [03/15/22 0832]   [facility] /72      Pulse 57      Resp 18      Temp 97.4 °F (36.3 °C)      Temp Source Infrared      SpO2 96 %      Weight 230 lb (104.3 kg)      Height 5' 9\" (1.753 m)      Head Circumference       Peak Flow       Pain Score       Pain Loc       Pain Edu? Excl. in [address]? Vitals during ED course were reviewed and are as charted. Constitutional: Minimal distress, Non-toxic appearance  Eyes: Conjunctiva normal, No discharge  HENT: Normocephalic, Atraumatic, posterior oropharynx is nonerythematous and without exudate, uvula is midline, oropharynx moist  Neck: Supple, no stridor, no grossly visible or palpable masses  Cardiovascular: Regular rate and rhythm, No murmurs, No rubs, No gallops, no JVD  Pulmonary/Chest: Normal breath sounds, No respiratory distress or accessory muscle use, No wheezing, crackles or rhonchi. Abdomen: Soft, nondistended and nonrigid, No tenderness or peritoneal signs, No masses, normal bowel sounds  Back: No midline point tenderness, No paraspinous muscle tenderness.  No CVA tenderness  Extremities: No gross deformities, no edema, no tenderness  Neurologic: Normal motor function, Normal sensory function, No focal deficits  Skin: Warm, Dry, No erythema, No rash, No cyanosis, No mottling  Lymphatic: No lymphadenopathy in the following location(s): cervical  Psychiatric: Alert and oriented x3, Affect normal          EKG Interpretation    Interpreted by emergency department physician    Rhythm: sinus bradycardia  Rate: 50-60  Axis: left  Ectopy: none  Conduction: normal  ST Segments: normal  T Waves: normal  Q Waves: none    Clinical Impression: Sinus bradycardia with left axis deviation        RADIOLOGY/PROCEDURES/LABS/MEDICATIONS ADMINISTERED:    I have reviewed and interpreted all of the currently available lab results from this visit (if applicable):  Results for orders placed or performed during the hospital encounter of 03/15/22   CBC with Auto Differential   Result Value Ref Range WBC 11.1 (H) 4.0 - 10.5 K/CU MM    RBC 4.85 4.6 - 6.2 M/CU MM    Hemoglobin 14.4 13.5 - 18.0 GM/DL    Hematocrit 42.9 42 - 52 %    MCV 88.5 78 - 100 FL    MCH 29.7 27 - 31 PG    MCHC 33.6 32.0 - 36.0 %    RDW 13.3 11.7 - 14.9 %    Platelets 611 345 - 013 K/CU MM    MPV 11.4 (H) 7.5 - 11.1 FL    Differential Type AUTOMATED DIFFERENTIAL     Segs Relative 70.0 (H) 36 - 66 %    Lymphocytes % 16.6 (L) 24 - 44 %    Monocytes % 10.1 (H) 0 - 4 %    Eosinophils % 2.2 0 - 3 %    Basophils % 0.7 0 - 1 %    Segs Absolute 7.8 K/CU MM    Lymphocytes Absolute 1.9 K/CU MM    Monocytes Absolute 1.1 K/CU MM    Eosinophils Absolute 0.2 K/CU MM    Basophils Absolute 0.1 K/CU MM    Immature Neutrophil % 0.4 0 - 0.43 %    Total Immature Neutrophil 0.04 K/CU MM   Comprehensive Metabolic Panel w/ Reflex to MG   Result Value Ref Range    Sodium 135 135 - 145 MMOL/L    Potassium 4.1 3.5 - 5.1 MMOL/L    Chloride 101 99 - 110 mMol/L    CO2 22 21 - 32 MMOL/L    BUN 20 6 - 23 MG/DL    CREATININE 1.2 0.9 - 1.3 MG/DL    Glucose 130 (H) 70 - 99 MG/DL    Calcium 9.1 8.3 - 10.6 MG/DL    Albumin 4.3 3.4 - 5.0 GM/DL    Total Protein 7.0 6.4 - 8.2 GM/DL    Total Bilirubin 0.9 0.0 - 1.0 MG/DL    ALT 49 (H) 10 - 40 U/L    AST 34 15 - 37 IU/L    Alkaline Phosphatase 90 40 - 129 IU/L    GFR Non- 59 (L) >60 mL/min/1.73m2    GFR African American >60 >60 mL/min/1.73m2    Anion Gap 12 4 - 16   Troponin   Result Value Ref Range    Troponin T <0.010 <0.01 NG/ML   EKG 12 Lead   Result Value Ref Range    Ventricular Rate 56 BPM    Atrial Rate 56 BPM    P-R Interval 204 ms    QRS Duration 112 ms    Q-T Interval 434 ms    QTc Calculation (Bazett) 418 ms    P Axis 41 degrees    R Axis -16 degrees    T Axis 36 degrees    Diagnosis       Sinus bradycardia  Nonspecific ST abnormality  Abnormal ECG  No previous ECGs available     EKG 12 Lead   Result Value Ref Range    Ventricular Rate 56 BPM    Atrial Rate 56 BPM    P-R Interval 182 ms    QRS Duration 98 ms    Q-T Interval 460 ms    QTc Calculation (Bazett) 443 ms    P Axis 18 degrees    R Axis -20 degrees    T Axis 33 degrees    Diagnosis       Sinus bradycardia  Nonspecific ST abnormality  Abnormal ECG  When compared with ECG of 15-MAR-2022 09:00,  No significant change was found            ABNORMAL LABS:  Labs Reviewed   CBC WITH AUTO DIFFERENTIAL - Abnormal; Notable for the following components:       Result Value    WBC 11.1 (*)     MPV 11.4 (*)     Segs Relative 70.0 (*)     Lymphocytes % 16.6 (*)     Monocytes % 10.1 (*)     All other components within normal limits   COMPREHENSIVE METABOLIC PANEL W/ REFLEX TO MG FOR LOW K - Abnormal; Notable for the following components:    Glucose 130 (*)     ALT 49 (*)     GFR Non- 59 (*)     All other components within normal limits   TROPONIN         IMAGING STUDIES ORDERED:  XR CHEST (2 VW)    I have personally viewed the imaging studies. The radiologist interpretation is:   XR CHEST (2 VW)   Final Result   No acute process. MEDICATIONS ADMINISTERED:  Medications - No data to display      COURSE & MEDICAL DECISION MAKING  Last vitals: /72   Pulse 57   Temp 97.4 °F (36.3 °C) (Infrared)   Resp 18   Ht 5' 9\" (1.753 m)   Wt 230 lb (104.3 kg)   SpO2 96%   BMI 33.97 kg/m²     65-year-old male with symptoms consistent with URI versus bronchitis. Most likely viral.  No radiographic evidence for pneumonia. Not in any respiratory distress. Satting well on room air. Not having any chest pain. Differential diagnoses considered included, but were not limited to, acute bronchospasm, allergic reaction/anaphylaxis, acute respiratory infection, pneumonia, acute coronary syndrome, dysrhythmia, congestive heart failure, non-CHF-related pulmonary edema, pneumothorax, pulmonary embolism, and anemia. Additional workup and treatment in the ED as documented above. Patient reassured and will be discharged home.  I have explained to the patient in appropriate terminology our work-up in the ED and their diagnosis. I have also given anticipatory guidance and expectant management of their condition as an outpatient as per my custom. The patient was given clear discharge and follow-up instructions including return to the ER immediately for worsening concerns. The patient has been advised to follow-up with their primary care physician and/or referred physician in the next two to three days or sooner if worsening and to return to the ER immediately as above with any concerns. I provided the patient counseling with regard to my customary list of strict return precautions as well as return precautions specific to the cause for today's emergency department visit. The patient will return under these provided conditions, but should also return for new concerns or further worsening. Pt and/or family understand and agree with plan. Clinical Impression:  1. Dyspnea, unspecified type        Disposition referral (if applicable):  Vitaly Mcallister MD  [address] [address] [phone]          [address] [place], [place] [address]way  [phone]    If symptoms worsen      Disposition medications (if applicable):  New Prescriptions    PREDNISONE (DELTASONE) 50 MG TABLET    Take 1 tablet by mouth daily for 5 days       ED Provider Disposition Time  DISPOSITION            Electronically signed by: Romelia Younger M.D., 3/15/2022 11:02 AM      This dictation was created with voice recognition software. While attempts have been made to review the dictation as it is transcribed, on occasion the spoken word can be misinterpreted by the technology leading to omissions or inappropriate words, phrases or sentences.         Hilary Sherwood MD  03/15/22 4217

## 2022-08-30 ENCOUNTER — TELEPHONE (OUTPATIENT)
Dept: PULMONOLOGY | Age: 77
End: 2022-08-30

## 2022-08-30 NOTE — TELEPHONE ENCOUNTER
LVM to see if pt would like to come in sooner to see Dr Mariangel Darnell instead. Dr Karlos Shelton will be out of the office on 10/10. Pts appt will also need to be changed to a follow up as he is not considered a consult.

## 2022-09-26 ENCOUNTER — OFFICE VISIT (OUTPATIENT)
Dept: PULMONOLOGY | Age: 77
End: 2022-09-26
Payer: COMMERCIAL

## 2022-09-26 VITALS
RESPIRATION RATE: 16 BRPM | HEART RATE: 70 BPM | WEIGHT: 225 LBS | SYSTOLIC BLOOD PRESSURE: 122 MMHG | BODY MASS INDEX: 33.33 KG/M2 | HEIGHT: 69 IN | DIASTOLIC BLOOD PRESSURE: 70 MMHG | OXYGEN SATURATION: 97 %

## 2022-09-26 DIAGNOSIS — R06.02 SOB (SHORTNESS OF BREATH) ON EXERTION: ICD-10-CM

## 2022-09-26 DIAGNOSIS — E66.9 OBESITY (BMI 30-39.9): ICD-10-CM

## 2022-09-26 DIAGNOSIS — G47.33 OSA (OBSTRUCTIVE SLEEP APNEA): ICD-10-CM

## 2022-09-26 DIAGNOSIS — J43.9 PULMONARY EMPHYSEMA, UNSPECIFIED EMPHYSEMA TYPE (HCC): ICD-10-CM

## 2022-09-26 DIAGNOSIS — Z87.891 EX-SMOKER: ICD-10-CM

## 2022-09-26 DIAGNOSIS — R91.8 PULMONARY NODULES/LESIONS, MULTIPLE: ICD-10-CM

## 2022-09-26 PROCEDURE — G8427 DOCREV CUR MEDS BY ELIG CLIN: HCPCS | Performed by: INTERNAL MEDICINE

## 2022-09-26 PROCEDURE — 3023F SPIROM DOC REV: CPT | Performed by: INTERNAL MEDICINE

## 2022-09-26 PROCEDURE — 99214 OFFICE O/P EST MOD 30 MIN: CPT | Performed by: INTERNAL MEDICINE

## 2022-09-26 PROCEDURE — G8417 CALC BMI ABV UP PARAM F/U: HCPCS | Performed by: INTERNAL MEDICINE

## 2022-09-26 RX ORDER — AMOXICILLIN 500 MG
CAPSULE ORAL
COMMUNITY

## 2022-09-26 RX ORDER — TRIAMTERENE AND HYDROCHLOROTHIAZIDE 37.5; 25 MG/1; MG/1
1 CAPSULE ORAL EVERY MORNING
COMMUNITY

## 2022-09-26 RX ORDER — BUDESONIDE AND FORMOTEROL FUMARATE DIHYDRATE 160; 4.5 UG/1; UG/1
2 AEROSOL RESPIRATORY (INHALATION) 2 TIMES DAILY
Qty: 10.2 G | Refills: 3 | Status: SHIPPED | OUTPATIENT
Start: 2022-09-26 | End: 2022-09-26 | Stop reason: SDUPTHER

## 2022-09-26 RX ORDER — BUDESONIDE AND FORMOTEROL FUMARATE DIHYDRATE 160; 4.5 UG/1; UG/1
2 AEROSOL RESPIRATORY (INHALATION) 2 TIMES DAILY
Qty: 10.2 G | Refills: 3 | Status: SHIPPED | OUTPATIENT
Start: 2022-09-26

## 2022-09-26 RX ORDER — LANOLIN ALCOHOL/MO/W.PET/CERES
1000 CREAM (GRAM) TOPICAL 2 TIMES DAILY WITH MEALS
COMMUNITY

## 2022-09-26 ASSESSMENT — ENCOUNTER SYMPTOMS
EYE ITCHING: 0
EYE DISCHARGE: 0
ABDOMINAL PAIN: 0
SHORTNESS OF BREATH: 0
BACK PAIN: 0
ABDOMINAL DISTENTION: 0
COUGH: 0

## 2022-09-26 ASSESSMENT — SLEEP AND FATIGUE QUESTIONNAIRES
HOW LIKELY ARE YOU TO NOD OFF OR FALL ASLEEP WHILE LYING DOWN TO REST IN THE AFTERNOON WHEN CIRCUMSTANCES PERMIT: 3
ESS TOTAL SCORE: 7
HOW LIKELY ARE YOU TO NOD OFF OR FALL ASLEEP WHILE WATCHING TV: 2
HOW LIKELY ARE YOU TO NOD OFF OR FALL ASLEEP WHILE SITTING AND READING: 0
HOW LIKELY ARE YOU TO NOD OFF OR FALL ASLEEP WHILE SITTING INACTIVE IN A PUBLIC PLACE: 0
HOW LIKELY ARE YOU TO NOD OFF OR FALL ASLEEP WHILE SITTING QUIETLY AFTER LUNCH WITHOUT ALCOHOL: 2
NECK CIRCUMFERENCE (INCHES): 15.5
HOW LIKELY ARE YOU TO NOD OFF OR FALL ASLEEP WHEN YOU ARE A PASSENGER IN A CAR FOR AN HOUR WITHOUT A BREAK: 0
HOW LIKELY ARE YOU TO NOD OFF OR FALL ASLEEP WHILE SITTING AND TALKING TO SOMEONE: 0
HOW LIKELY ARE YOU TO NOD OFF OR FALL ASLEEP IN A CAR, WHILE STOPPED FOR A FEW MINUTES IN TRAFFIC: 0

## 2022-09-26 NOTE — PROGRESS NOTES
MG tablet Take 1 mg by mouth nightly as needed for Sleep.      esomeprazole Magnesium (NEXIUM) 40 MG PACK Take 40 mg by mouth daily. Lutein 20 MG CAPS Take  by mouth.      ezetimibe (ZETIA) 10 MG tablet Take 1 tablet by mouth daily (Patient not taking: Reported on 2022)      Naproxen Sod-Diphenhydramine (ALEVE PM PO) Take by mouth nightly as needed (Patient not taking: No sig reported)      amLODIPine-benazepril (LOTREL) 5-40 MG per capsule Take 1 capsule by mouth daily. (Patient not taking: Reported on 2022)      Red Yeast Rice Extract (RED YEAST RICE PO) Take  by mouth. (Patient not taking: No sig reported)      VALERIAN ROOT PO Take  by mouth. (Patient not taking: No sig reported)      Ritarenata Louis POWD by Does not apply route. (Patient not taking: No sig reported)      fluticasone (FLONASE) 50 MCG/ACT nasal spray 1 spray by Nasal route daily. (Patient not taking: No sig reported)       No current facility-administered medications for this visit. Allergies   Allergen Reactions    Hydrocodone-Acetaminophen Itching       Past Medical History:   Diagnosis Date    COPD (chronic obstructive pulmonary disease) (HCC)     Hypertension        Past Surgical History:   Procedure Laterality Date    CARPAL TUNNEL RELEASE      MASTOIDECTOMY      SINUS ENDOSCOPY         Social History     Socioeconomic History    Marital status:      Spouse name: None    Number of children: None    Years of education: None    Highest education level: None   Tobacco Use    Smoking status: Former     Packs/day: 1.00     Years: 30.00     Pack years: 30.00     Types: Cigarettes     Quit date: 1988     Years since quittin.7    Smokeless tobacco: Never   Substance and Sexual Activity    Alcohol use: No     Alcohol/week: 0.0 standard drinks       Review of Systems   Constitutional:  Negative for fatigue. HENT:  Negative for congestion and postnasal drip. Eyes:  Negative for discharge and itching. Respiratory:  Negative for cough and shortness of breath. Cardiovascular:  Negative for chest pain and leg swelling. Gastrointestinal:  Negative for abdominal distention and abdominal pain. Endocrine: Negative for cold intolerance and heat intolerance. Genitourinary:  Negative for enuresis and frequency. Musculoskeletal:  Negative for arthralgias and back pain. Allergic/Immunologic: Negative for environmental allergies and food allergies. Neurological:  Negative for light-headedness and headaches. Hematological:  Negative for adenopathy. Psychiatric/Behavioral:  Negative for agitation and behavioral problems. Objective:   /70   Pulse 70   Resp 16   Ht 5' 9\" (1.753 m)   Wt 225 lb (102.1 kg)   SpO2 97%   BMI 33.23 kg/m²   Body mass index is 33.23 kg/m². Sleep Medicine 9/26/2022   Sitting and reading 0   Watching TV 2   Sitting, inactive in a public place (e.g. a theatre or a meeting) 0   As a passenger in a car for an hour without a break 0   Lying down to rest in the afternoon when circumstances permit 3   Sitting and talking to someone 0   Sitting quietly after a lunch without alcohol 2   In a car, while stopped for a few minutes in traffic 0   Green Sleepiness Score 7   Neck circumference (Inches) 15.5     Mallampati 3    Physical Exam  Vitals reviewed. Constitutional:       Appearance: Normal appearance. HENT:      Head: Normocephalic and atraumatic. Nose: Nose normal.      Mouth/Throat:      Mouth: Mucous membranes are moist.   Eyes:      Extraocular Movements: Extraocular movements intact. Pupils: Pupils are equal, round, and reactive to light. Cardiovascular:      Rate and Rhythm: Normal rate and regular rhythm. Pulses: Normal pulses. Heart sounds: Normal heart sounds. Pulmonary:      Effort: Pulmonary effort is normal.      Breath sounds: Normal breath sounds. Abdominal:      General: Abdomen is flat. Palpations: Abdomen is soft. Musculoskeletal:         General: Normal range of motion. Cervical back: Normal range of motion and neck supple. Skin:     General: Skin is warm and dry. Neurological:      General: No focal deficit present. Mental Status: He is alert and oriented to person, place, and time. Psychiatric:         Mood and Affect: Mood normal.         Behavior: Behavior normal.       Radiology: none    Assessment and Plan     Problem List          Respiratory    Pulmonary emphysema (HCC)      C/w quitting smoking  C.w Inhalers  Get yearly flu vaccine  D/c trelegy  I'll start  him on Symbicort and Albuterol prn         Relevant Medications    fluticasone (FLONASE) 50 MCG/ACT nasal spray    albuterol sulfate (PROAIR RESPICLICK) 543 (90 Base) MCG/ACT aerosol powder inhalation    fluticasone-umeclidin-vilant (TRELEGY ELLIPTA) 100-62.5-25 MCG/INH AEPB    budesonide-formoterol (SYMBICORT) 160-4.5 MCG/ACT AERO    JANET (obstructive sleep apnea)      He has JANET , non compliant with the CPAP  Loose weight            Other    Obesity (BMI 30-39. 9)      Advised to loose weight with diet and exercise           Ex-smoker      Advised to c.w quitting smoking         Relevant Orders    Full PFT Study With Bronchodilator    SOB (shortness of breath) on exertion      Improving  Loose weight  Albuterol prn  Sumbicort         Relevant Orders    Full PFT Study With Bronchodilator    Pulmonary nodules/lesions, multiple      They have been stable for almost 2 years  No CT chest unless any problem                 Follow-Up:    Return in about 1 year (around 9/26/2023) for PFT.      Progress notes sent to the referring Provider    Arthuro Boeck, MD MD  9/26/2022  1:09 PM

## 2022-09-26 NOTE — ASSESSMENT & PLAN NOTE
C/w quitting smoking  C.w Inhalers  Get yearly flu vaccine  D/c trelegy  I'll start  him on Symbicort and Albuterol prn